# Patient Record
Sex: FEMALE | Race: OTHER | URBAN - METROPOLITAN AREA
[De-identification: names, ages, dates, MRNs, and addresses within clinical notes are randomized per-mention and may not be internally consistent; named-entity substitution may affect disease eponyms.]

---
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
## 2022-02-09 ENCOUNTER — COMPREHENSIVE EXAM (OUTPATIENT)
Dept: URBAN - METROPOLITAN AREA CLINIC 1 | Facility: CLINIC | Age: 22
End: 2022-02-09

## 2022-02-09 DIAGNOSIS — H52.13: ICD-10-CM

## 2022-02-09 PROCEDURE — S0621 ROUTINE OPHTHALMOLOGICAL EXA: HCPCS

## 2022-02-09 ASSESSMENT — VISUAL ACUITY
OS_CC: 20/20
OD_CC: 20/20
OD_CC: J1+
OS_CC: J1+

## 2022-02-09 ASSESSMENT — TONOMETRY
OD_IOP_MMHG: 16
OS_IOP_MMHG: 16

## 2022-04-02 ASSESSMENT — VISUAL ACUITY
OD_CC: 20/60
OS_SC: 20/25-1
OS_SC: 20/20
OD_CC: J1+
OS_SC: J1+
OS_SC: 20/20-1
OS_CC: J1+
OD_CC: J1
OS_CC: 20/50
OD_CC: J1
OD_SC: 20/20
OS_CC: J1+
OD_SC: J1+
OD_SC: 20/20
OD_CC: J1+
OS_CC: J1
OS_CC: J1
OD_SC: 20/25-2
OD_SC: 20/30
OS_SC: 20/25-1

## 2022-04-02 ASSESSMENT — TONOMETRY
OS_IOP_MMHG: 18
OD_IOP_MMHG: 17
OS_IOP_MMHG: 18
OS_IOP_MMHG: 18
OD_IOP_MMHG: 18
OD_IOP_MMHG: 18
OD_IOP_MMHG: 16
OS_IOP_MMHG: 17

## 2022-04-02 ASSESSMENT — KERATOMETRY
OS_K2POWER_DIOPTERS: 43.00
OD_K1POWER_DIOPTERS: 41.75
OS_AXISANGLE2_DEGREES: 069
OD_AXISANGLE2_DEGREES: 086
OS_K1POWER_DIOPTERS: 41.75
OD_K2POWER_DIOPTERS: 42.25

## 2022-10-10 ENCOUNTER — COMPREHENSIVE EXAM (OUTPATIENT)
Dept: URBAN - METROPOLITAN AREA CLINIC 1 | Facility: CLINIC | Age: 22
End: 2022-10-10

## 2022-10-10 DIAGNOSIS — H40.023: ICD-10-CM

## 2022-10-10 PROCEDURE — 92014 COMPRE OPH EXAM EST PT 1/>: CPT

## 2022-10-10 ASSESSMENT — TONOMETRY
OS_IOP_MMHG: 18
OD_IOP_MMHG: 18

## 2022-10-10 ASSESSMENT — VISUAL ACUITY
OD_CC: 20/20
OS_CC: 20/20

## 2022-10-10 NOTE — PATIENT DISCUSSION
(CD .60 OU) IOP 18OU.  Patient is considered high risk. (+) Family Hx (Grandparents) Condition was discussed with patient and patient understands. Will continue to monitor patient for any progression in condition. Patient was advised to call us with any problems, questions, or concerns. Will return in 1 year for 30/OCT.

## 2023-10-18 ENCOUNTER — COMPREHENSIVE EXAM (OUTPATIENT)
Dept: URBAN - METROPOLITAN AREA CLINIC 1 | Facility: CLINIC | Age: 23
End: 2023-10-18

## 2023-10-18 DIAGNOSIS — H40.023: ICD-10-CM

## 2023-10-18 PROCEDURE — 92133 CPTRZD OPH DX IMG PST SGM ON: CPT

## 2023-10-18 PROCEDURE — 92014 COMPRE OPH EXAM EST PT 1/>: CPT

## 2023-10-18 ASSESSMENT — KERATOMETRY
OD_AXISANGLE2_DEGREES: 84
OS_AXISANGLE_DEGREES: 180
OS_K1POWER_DIOPTERS: 41.75
OD_AXISANGLE_DEGREES: 174
OD_K1POWER_DIOPTERS: 41.75
OD_K2POWER_DIOPTERS: 43.00
OS_AXISANGLE2_DEGREES: 90
OS_K2POWER_DIOPTERS: 43.25

## 2023-10-18 ASSESSMENT — VISUAL ACUITY
OD_CC: 20/20-2
OS_CC: 20/20
OD_CC: J1+
OS_CC: J1+

## 2023-10-18 ASSESSMENT — TONOMETRY
OS_IOP_MMHG: 16
OD_IOP_MMHG: 18

## 2024-03-07 ENCOUNTER — COMPREHENSIVE EXAM (OUTPATIENT)
Dept: URBAN - METROPOLITAN AREA CLINIC 1 | Facility: CLINIC | Age: 24
End: 2024-03-07

## 2024-03-07 DIAGNOSIS — Z46.0: ICD-10-CM

## 2024-03-07 DIAGNOSIS — H52.223: ICD-10-CM

## 2024-03-07 DIAGNOSIS — H52.13: ICD-10-CM

## 2024-03-07 DIAGNOSIS — Z01.00: ICD-10-CM

## 2024-03-07 PROCEDURE — 92014 COMPRE OPH EXAM EST PT 1/>: CPT

## 2024-03-07 PROCEDURE — 92310-2 LEVEL 2 CONTACT LENS MANAGEMENT

## 2024-03-07 PROCEDURE — 92015 DETERMINE REFRACTIVE STATE: CPT

## 2024-03-07 ASSESSMENT — KERATOMETRY
OS_AXISANGLE_DEGREES: 180
OD_AXISANGLE_DEGREES: 174
OD_K2POWER_DIOPTERS: 43.00
OD_K1POWER_DIOPTERS: 41.75
OS_AXISANGLE2_DEGREES: 90
OD_AXISANGLE2_DEGREES: 84
OS_K2POWER_DIOPTERS: 43.25
OS_K1POWER_DIOPTERS: 41.75

## 2024-03-07 ASSESSMENT — VISUAL ACUITY
OS_CC: 20/25
OS_CC: J1+
OD_CC: J1+
OD_CC: 20/25

## 2024-03-07 ASSESSMENT — TONOMETRY
OD_IOP_MMHG: 16
OS_IOP_MMHG: 16

## 2024-10-21 ENCOUNTER — COMPREHENSIVE EXAM (OUTPATIENT)
Dept: URBAN - METROPOLITAN AREA CLINIC 1 | Facility: CLINIC | Age: 24
End: 2024-10-21

## 2024-10-21 DIAGNOSIS — H40.023: ICD-10-CM

## 2024-10-21 PROCEDURE — 92133 CPTRZD OPH DX IMG PST SGM ON: CPT

## 2024-10-21 PROCEDURE — 92014 COMPRE OPH EXAM EST PT 1/>: CPT

## 2025-03-10 ENCOUNTER — COMPREHENSIVE EXAM (OUTPATIENT)
Age: 25
End: 2025-03-10

## 2025-03-10 DIAGNOSIS — Z01.00: ICD-10-CM

## 2025-03-10 DIAGNOSIS — H52.13: ICD-10-CM

## 2025-03-10 DIAGNOSIS — H52.223: ICD-10-CM

## 2025-03-10 PROCEDURE — 92014 COMPRE OPH EXAM EST PT 1/>: CPT

## 2025-03-10 PROCEDURE — 92015 DETERMINE REFRACTIVE STATE: CPT
